# Patient Record
Sex: FEMALE | Race: WHITE | NOT HISPANIC OR LATINO | Employment: UNEMPLOYED | URBAN - METROPOLITAN AREA
[De-identification: names, ages, dates, MRNs, and addresses within clinical notes are randomized per-mention and may not be internally consistent; named-entity substitution may affect disease eponyms.]

---

## 2020-08-28 ENCOUNTER — OFFICE VISIT (OUTPATIENT)
Dept: FAMILY MEDICINE CLINIC | Facility: CLINIC | Age: 35
End: 2020-08-28

## 2020-08-28 VITALS
DIASTOLIC BLOOD PRESSURE: 68 MMHG | WEIGHT: 129.2 LBS | BODY MASS INDEX: 19.13 KG/M2 | HEART RATE: 94 BPM | HEIGHT: 69 IN | SYSTOLIC BLOOD PRESSURE: 110 MMHG | OXYGEN SATURATION: 93 %

## 2020-08-28 DIAGNOSIS — K58.1 IRRITABLE BOWEL SYNDROME WITH CONSTIPATION: ICD-10-CM

## 2020-08-28 DIAGNOSIS — F33.9 EPISODE OF RECURRENT MAJOR DEPRESSIVE DISORDER, UNSPECIFIED DEPRESSION EPISODE SEVERITY (HCC): ICD-10-CM

## 2020-08-28 DIAGNOSIS — Z51.81 THERAPEUTIC DRUG MONITORING: ICD-10-CM

## 2020-08-28 DIAGNOSIS — G47.00 INSOMNIA, UNSPECIFIED TYPE: ICD-10-CM

## 2020-08-28 DIAGNOSIS — F41.1 GENERALIZED ANXIETY DISORDER: Primary | ICD-10-CM

## 2020-08-28 PROCEDURE — 99204 OFFICE O/P NEW MOD 45 MIN: CPT | Performed by: INTERNAL MEDICINE

## 2020-08-28 RX ORDER — CLONAZEPAM 1 MG/1
1 TABLET ORAL 2 TIMES DAILY PRN
Qty: 10 TABLET | Refills: 0 | Status: SHIPPED | OUTPATIENT
Start: 2020-08-28

## 2020-08-28 RX ORDER — CLONAZEPAM 1 MG/1
1 TABLET ORAL 3 TIMES DAILY PRN
COMMUNITY
End: 2020-08-28 | Stop reason: SDUPTHER

## 2020-08-28 RX ORDER — PAROXETINE 30 MG/1
30 TABLET, FILM COATED ORAL EVERY MORNING
Qty: 90 TABLET | Refills: 1 | Status: SHIPPED | OUTPATIENT
Start: 2020-08-28 | End: 2020-11-09

## 2020-08-28 RX ORDER — ETONOGESTREL AND ETHINYL ESTRADIOL 11.7; 2.7 MG/1; MG/1
1 INSERT, EXTENDED RELEASE VAGINAL
COMMUNITY

## 2020-08-28 RX ORDER — PAROXETINE 30 MG/1
30 TABLET, FILM COATED ORAL EVERY MORNING
COMMUNITY
End: 2020-08-28 | Stop reason: SDUPTHER

## 2020-08-28 RX ORDER — HYDROXYZINE PAMOATE 50 MG/1
50 CAPSULE ORAL 3 TIMES DAILY PRN
Qty: 60 CAPSULE | Refills: 5 | Status: SHIPPED | OUTPATIENT
Start: 2020-08-28

## 2020-08-28 RX ORDER — BUPRENORPHINE HYDROCHLORIDE AND NALOXONE HYDROCHLORIDE DIHYDRATE 8; 2 MG/1; MG/1
TABLET SUBLINGUAL
COMMUNITY
Start: 2020-08-21

## 2020-08-28 RX ORDER — ONDANSETRON HYDROCHLORIDE 8 MG/1
TABLET, FILM COATED ORAL
COMMUNITY
Start: 2020-08-24 | End: 2020-12-12 | Stop reason: SDUPTHER

## 2020-08-28 RX ORDER — TRAZODONE HYDROCHLORIDE 50 MG/1
50 TABLET ORAL NIGHTLY
COMMUNITY
End: 2020-08-28 | Stop reason: SDUPTHER

## 2020-08-28 RX ORDER — TRAZODONE HYDROCHLORIDE 50 MG/1
50 TABLET ORAL NIGHTLY
Qty: 90 TABLET | Refills: 1 | Status: SHIPPED | OUTPATIENT
Start: 2020-08-28 | End: 2020-11-09

## 2020-08-28 RX ORDER — HYDROXYZINE PAMOATE 50 MG/1
50 CAPSULE ORAL 3 TIMES DAILY PRN
COMMUNITY
End: 2020-08-28 | Stop reason: SDUPTHER

## 2020-08-28 NOTE — PROGRESS NOTES
Chief Complaint   Patient presents with   • Establish Care       HPI:  Rosette Mendes is a 35 y.o. female who presents today for establish care. She recently moved from [place]. She has reportedly been on daily benzos 1-3x per day for the last 6 months. She is out of her klonopin today and says she is afraid because she has a history of seizures from withdraw. Hx of substance abuse with recent relapse. Currently on suboxone, following with suboxone clinic in [place]. She is attempting to wean off. She would like to wean off klonopin as well, she has a psychiatry apt next Tuesday with Dr. Recio. She has anxiety, depression and insomnia for which she takes paxil and trazodone, respectively. She has IBS constipation and is currently taking lactulose as needed. She recently went to the ER for constipation and was prescribed this. No hx of colonoscopy. Has not tried any other medication besides OTC laxatives.     ROS:  Constitutional: no fevers, night sweats or unexplained weight loss  Eyes: no vision changes  ENT: no runny nose, ear pain, sore throat  Cardio: no chest pain, palpitations  Pulm: no shortness of breath, wheezing, or cough  GI: no abdominal pain or changes in bowel movements  : no difficulty urinating  MSK: no difficulty ambulating, no joint pain  Neuro: no weakness, dizziness or headache  Psych: no trouble sleeping  Endo: no change in appetite      Past Medical History:   Diagnosis Date   • Depression    • Frequent UTI    • Hyperlipidemia    • Ovarian cyst    • Seizures (CMS/HCC)       Family History   Problem Relation Age of Onset   • Arthritis Mother    • Mental illness Mother    • Hypertension Mother    • Hyperlipidemia Mother    • Mental illness Father    • Cancer Maternal Grandmother         Lung Cancer   • Heart attack Maternal Grandmother    • Cancer Maternal Grandfather         Lung Cancer      Social History     Socioeconomic History   • Marital status: Significant Other     Spouse name: Not  on file   • Number of children: Not on file   • Years of education: Not on file   • Highest education level: Not on file   Tobacco Use   • Smoking status: Never Smoker   • Smokeless tobacco: Never Used   Substance and Sexual Activity   • Alcohol use: Never     Frequency: Never   • Drug use: Never      No Known Allergies     There is no immunization history on file for this patient.     PE:  Vitals:    08/28/20 1506   BP: 110/68   Pulse: 94   SpO2: 93%      Body mass index is 19.08 kg/m².    Gen Appearance: NAD  HEENT: Normocephalic, PERRLA, no thyromegaly, trache midline  Heart: RRR, normal S1 and S2, no murmur  Lungs: CTA b/l, no wheezing, no crackles  Abdomen: Soft, non-tender, non-distended, no guarding and BSx4  MSK: Moves all extremities well, normal gait, no peripheral edema  Pulses: Palpable and equal b/l  Lymph nodes: No palpable lymphadenopathy   Neuro: No focal deficits      Current Outpatient Medications   Medication Sig Dispense Refill   • buprenorphine-naloxone (SUBOXONE) 8-2 MG per SL tablet DISSOLVE 1 TABLET SUBLINGUALLY DAILY     • clonazePAM (KlonoPIN) 1 MG tablet Take 1 tablet by mouth 2 (Two) Times a Day As Needed for Anxiety. 10 tablet 0   • etonogestrel-ethinyl estradiol (NuvaRing) 0.12-0.015 MG/24HR vaginal ring Insert 1 each into the vagina Every 28 (Twenty-Eight) Days. Insert vaginally and leave in place for 3 consecutive weeks, then remove for 1 week.     • hydrOXYzine pamoate (VISTARIL) 50 MG capsule Take 1 capsule by mouth 3 (Three) Times a Day As Needed for Anxiety. 60 capsule 5   • ondansetron (ZOFRAN) 8 MG tablet TK 1 T PO Q 8 H     • PARoxetine (PAXIL) 30 MG tablet Take 1 tablet by mouth Every Morning. 90 tablet 1   • traZODone (DESYREL) 50 MG tablet Take 1 tablet by mouth Every Night. 90 tablet 1     No current facility-administered medications for this visit.         Rosette was seen today for establish care.    Diagnoses and all orders for this visit:    Generalized anxiety  disorder  -     clonazePAM (KlonoPIN) 1 MG tablet; Take 1 tablet by mouth 2 (Two) Times a Day As Needed for Anxiety.  -     hydrOXYzine pamoate (VISTARIL) 50 MG capsule; Take 1 capsule by mouth 3 (Three) Times a Day As Needed for Anxiety.  -     Urine Drug Screen - Urine, Clean Catch; Future  Pt agreeable to benzo taper. I would recommend a 2-3 month taper with her history. [employer] is not functioning at this time and unable to obtain [place] controlled substance records anyway. At this time I would recommend reducing klonopin dose to twice daily. Will provide enough tabs to last until psychiatry apt next week. Refill paxil. Will need f/u apt sooner depending on psych apt.   Irritable bowel syndrome with constipation  Trial linzess, failed OTC medication and has long history of chronic constipation. Will refer to GI to establish care, would likely benefit from c-scope.   Episode of recurrent major depressive disorder, unspecified depression episode severity (CMS/HCC)  -     PARoxetine (PAXIL) 30 MG tablet; Take 1 tablet by mouth Every Morning.    Insomnia, unspecified type  -     traZODone (DESYREL) 50 MG tablet; Take 1 tablet by mouth Every Night.    Therapeutic drug monitoring  -     Urine Drug Screen - Urine, Clean Catch; Future         Return in about 4 weeks (around 9/25/2020) for Annual.     Please note that portions of this document were completed with a voice recognition program. Efforts were made to edit the dictations, but occasionally words are mis-transcribed.

## 2020-09-09 DIAGNOSIS — F41.1 GENERALIZED ANXIETY DISORDER: ICD-10-CM

## 2020-09-09 DIAGNOSIS — Z51.81 THERAPEUTIC DRUG MONITORING: ICD-10-CM

## 2020-09-16 ENCOUNTER — TELEPHONE (OUTPATIENT)
Dept: FAMILY MEDICINE CLINIC | Facility: CLINIC | Age: 35
End: 2020-09-16

## 2020-09-17 DIAGNOSIS — K58.1 IRRITABLE BOWEL SYNDROME WITH CONSTIPATION: Primary | ICD-10-CM

## 2020-09-17 DIAGNOSIS — Z12.4 CERVICAL CANCER SCREENING: Primary | ICD-10-CM

## 2020-09-17 NOTE — TELEPHONE ENCOUNTER
I did not see any referrals in patient chart. Were you planning on referring patient to GI and OB/GYN?

## 2020-09-18 ENCOUNTER — TELEPHONE (OUTPATIENT)
Dept: FAMILY MEDICINE CLINIC | Facility: CLINIC | Age: 35
End: 2020-09-18

## 2020-11-03 ENCOUNTER — OFFICE VISIT (OUTPATIENT)
Dept: GASTROENTEROLOGY | Facility: CLINIC | Age: 35
End: 2020-11-03

## 2020-11-03 VITALS
BODY MASS INDEX: 21.33 KG/M2 | WEIGHT: 144 LBS | DIASTOLIC BLOOD PRESSURE: 60 MMHG | TEMPERATURE: 98.4 F | OXYGEN SATURATION: 88 % | SYSTOLIC BLOOD PRESSURE: 112 MMHG | HEART RATE: 67 BPM | HEIGHT: 69 IN

## 2020-11-03 DIAGNOSIS — K59.04 CHRONIC IDIOPATHIC CONSTIPATION: Primary | ICD-10-CM

## 2020-11-03 PROCEDURE — 99243 OFF/OP CNSLTJ NEW/EST LOW 30: CPT | Performed by: INTERNAL MEDICINE

## 2020-11-03 RX ORDER — LACTULOSE 10 G/15ML
SOLUTION ORAL
Qty: 1892 ML | Refills: 6 | Status: SHIPPED | OUTPATIENT
Start: 2020-11-03

## 2020-11-03 NOTE — PROGRESS NOTES
PCP: Enmanuel Edwards DO    Chief Complaint   Patient presents with   • NEW PATIENT; IBS WITH CONSTIPATION        History of Present Illness:   Rosette Mendes is a 35 y.o. female who presents to the GI clinic as a consult for constipation. She has a h/o seizures, depression. Diagnosed with ibs-c in [place]. + ed visit for abdominal pain and improvement with lactulose.Has gone 3 weeks without bm. She will have nausea and epigastric/llq achy intermittent pain. She has to manually remove stool on occasion.  + hematochezia when she manipulates.      Past Medical History:   Diagnosis Date   • Depression    • Frequent UTI    • Hyperlipidemia    • Ovarian cyst    • Seizures (CMS/HCC)        History reviewed. No pertinent surgical history.      Current Outpatient Medications:   •  buprenorphine-naloxone (SUBOXONE) 8-2 MG per SL tablet, DISSOLVE 1 TABLET SUBLINGUALLY DAILY, Disp: , Rfl:   •  clonazePAM (KlonoPIN) 1 MG tablet, Take 1 tablet by mouth 2 (Two) Times a Day As Needed for Anxiety., Disp: 10 tablet, Rfl: 0  •  etonogestrel-ethinyl estradiol (NuvaRing) 0.12-0.015 MG/24HR vaginal ring, Insert 1 each into the vagina Every 28 (Twenty-Eight) Days. Insert vaginally and leave in place for 3 consecutive weeks, then remove for 1 week., Disp: , Rfl:   •  hydrOXYzine pamoate (VISTARIL) 50 MG capsule, Take 1 capsule by mouth 3 (Three) Times a Day As Needed for Anxiety., Disp: 60 capsule, Rfl: 5  •  ondansetron (ZOFRAN) 8 MG tablet, TK 1 T PO Q 8 H, Disp: , Rfl:   •  PARoxetine (PAXIL) 30 MG tablet, Take 1 tablet by mouth Every Morning., Disp: 90 tablet, Rfl: 1  •  traZODone (DESYREL) 50 MG tablet, Take 1 tablet by mouth Every Night., Disp: 90 tablet, Rfl: 1    No Known Allergies    Family History   Problem Relation Age of Onset   • Arthritis Mother    • Mental illness Mother    • Hypertension Mother    • Hyperlipidemia Mother    • Mental illness Father    • Cancer Maternal Grandmother         Lung Cancer   • Heart  attack Maternal Grandmother    • Cancer Maternal Grandfather         Lung Cancer       Social History     Socioeconomic History   • Marital status: Significant Other     Spouse name: Not on file   • Number of children: Not on file   • Years of education: Not on file   • Highest education level: Not on file   Tobacco Use   • Smoking status: Never Smoker   • Smokeless tobacco: Never Used   Substance and Sexual Activity   • Alcohol use: Never     Frequency: Never   • Drug use: Never       Review of Systems   Constitutional: Positive for fatigue and fever (face will flash and super hot, during the moment of unable to have a BM).   Gastrointestinal: Positive for constipation.        IBS WITH CONSTIPATION   All other systems reviewed and are negative.    All other systems reviewed and are negative.    There were no vitals filed for this visit.    Physical Exam  General Appearance:  Vitals as above. no acute distress  Head/face:  Normocephalic, atraumatic  Eyes:   EOMI, no conjunctivitis or icterus   Nose/Sinuses:  Nares patent bilaterally without discharge or lesions  Mouth/Throat:  Normal oral movements without dyskinesia  Neck:  trachea is midline, no thyromegaly  Lungs:  Normal work of breathing effort, no overt rales  Heart:  No overt JVD or type VI murmur  Abdomen:  Nondistended, no guarding or rebound tenderness  Neurologic:  Alert; no focal deficits; age appropriate behavior and speech  Psychiatric: mood and affect are congruent  Vascular: extremities without edema  Skin: no rash or cyanosis.      Assessment/Plan  1.) Constipation  2.) Hematochezia  She definitely has an outlet constipation with manipulation required to evacuate. She may also have slow transit and ibs-c.    Due to hematochezia, will schedule colonoscopy. Would have this done after 1-2 weeks of bowel optimization with medical therapy and give a 48 hour bowel prep.  - start linzess or lactulose  - start miralax, educated how to take  - recommended to  start the process with a tap water enema   - refer for biofeedback therapy  - f/u in 4-6 months    Zaki Raymond MD  11/3/2020

## 2020-11-09 ENCOUNTER — E-VISIT (OUTPATIENT)
Dept: INTERNAL MEDICINE | Facility: CLINIC | Age: 35
End: 2020-11-09

## 2020-11-09 PROCEDURE — U0003 INFECTIOUS AGENT DETECTION BY NUCLEIC ACID (DNA OR RNA); SEVERE ACUTE RESPIRATORY SYNDROME CORONAVIRUS 2 (SARS-COV-2) (CORONAVIRUS DISEASE [COVID-19]), AMPLIFIED PROBE TECHNIQUE, MAKING USE OF HIGH THROUGHPUT TECHNOLOGIES AS DESCRIBED BY CMS-2020-01-R: HCPCS | Performed by: FAMILY MEDICINE

## 2020-11-10 ENCOUNTER — TELEPHONE (OUTPATIENT)
Dept: URGENT CARE | Facility: CLINIC | Age: 35
End: 2020-11-10

## 2020-11-10 ENCOUNTER — TELEPHONE (OUTPATIENT)
Dept: FAMILY MEDICINE CLINIC | Facility: CLINIC | Age: 35
End: 2020-11-10

## 2020-11-10 NOTE — TELEPHONE ENCOUNTER
THE PATIENT REPORTS SHE WENT TO  URGENT CARE [facility] AND WAS DIAGNOSED THE PATIENT WITH BRONCHITIS.  THE PATIENT'S INSURANCE WILL NOT COVER THE albuterol sulfate  WITHOUT A PRIOR AUTHORIZATION.  THE PHARMACY TOLD THE PATIENT TO CONTACT HER PCP (DR. HAMPTON) FOR A PRIOR AUTHORIZATION.     PLEASE CALL AND ADVISE [phone]

## 2020-11-11 ENCOUNTER — TELEPHONE (OUTPATIENT)
Dept: URGENT CARE | Facility: CLINIC | Age: 35
End: 2020-11-11

## 2020-11-11 NOTE — TELEPHONE ENCOUNTER
----- Message from Amaya Schroeder, JOSUÉ, APRN sent at 11/11/2020  1:19 PM EST -----  Please notify the patient of negative result sc    ----- Message -----  From: Lab, Background User  Sent: 11/11/2020   1:10 PM EST  To:  Leyda Winklerid Results

## 2020-11-30 DIAGNOSIS — Z12.11 SCREENING FOR COLON CANCER: Primary | ICD-10-CM

## 2020-12-04 ENCOUNTER — APPOINTMENT (OUTPATIENT)
Dept: PREADMISSION TESTING | Facility: HOSPITAL | Age: 35
End: 2020-12-04

## 2020-12-04 PROCEDURE — U0004 COV-19 TEST NON-CDC HGH THRU: HCPCS

## 2020-12-04 PROCEDURE — C9803 HOPD COVID-19 SPEC COLLECT: HCPCS

## 2020-12-05 LAB — SARS-COV-2 RNA RESP QL NAA+PROBE: NOT DETECTED

## 2020-12-07 ENCOUNTER — OUTSIDE FACILITY SERVICE (OUTPATIENT)
Dept: GASTROENTEROLOGY | Facility: CLINIC | Age: 35
End: 2020-12-07

## 2020-12-07 PROCEDURE — 45378 DIAGNOSTIC COLONOSCOPY: CPT | Performed by: INTERNAL MEDICINE

## 2020-12-12 ENCOUNTER — TELEMEDICINE (OUTPATIENT)
Dept: FAMILY MEDICINE CLINIC | Facility: CLINIC | Age: 35
End: 2020-12-12

## 2020-12-12 ENCOUNTER — TELEPHONE (OUTPATIENT)
Dept: FAMILY MEDICINE CLINIC | Facility: CLINIC | Age: 35
End: 2020-12-12

## 2020-12-12 DIAGNOSIS — R11.2 INTRACTABLE VOMITING WITH NAUSEA, UNSPECIFIED VOMITING TYPE: Primary | ICD-10-CM

## 2020-12-12 PROCEDURE — 99213 OFFICE O/P EST LOW 20 MIN: CPT | Performed by: NURSE PRACTITIONER

## 2020-12-12 RX ORDER — ONDANSETRON HYDROCHLORIDE 8 MG/1
8 TABLET, FILM COATED ORAL EVERY 8 HOURS PRN
Qty: 30 TABLET | Refills: 1 | Status: SHIPPED | OUTPATIENT
Start: 2020-12-12

## 2020-12-12 RX ORDER — FLUOXETINE HYDROCHLORIDE 20 MG/1
1 CAPSULE ORAL
COMMUNITY

## 2020-12-12 NOTE — PROGRESS NOTES
Subjective   Rosette Mendes is a 35 y.o. female. Vomiting    You have chosen to receive care through a telehealth visit.  Do you consent to use a video/audio connection for your medical care today? Yes    History of Present Illness   She has a script for Zofran for as needed use.  She feels like she really needs it right now.  She has a medication that she takes the causes nausea.  She doesn't want to get dehydrated from vomiting.  The Suboxone makes her really sick to her stomach.  She is currently tapering off of this as this is causing other health issues for her.  The nausea started last night.  She started throwing up last night.  She has thrown up 6-7 times.  Everytime she drinks water she throws it up.  The Suboxone has always had this effect on her.    She denies any other needs today.    The following portions of the patient's history were reviewed and updated as appropriate: allergies, current medications, past medical history and problem list.    Review of Systems   Constitutional: Negative.    Respiratory: Negative.    Cardiovascular: Negative.    Gastrointestinal: Positive for nausea and vomiting. Negative for diarrhea.   Genitourinary: Negative.        Objective   There were no vitals filed for this visit.   Physical Exam  Constitutional:       Appearance: Normal appearance.   HENT:      Head: Normocephalic and atraumatic.   Neurological:      General: No focal deficit present.      Mental Status: She is alert and oriented to person, place, and time.   Psychiatric:         Mood and Affect: Mood normal.         Behavior: Behavior normal.         Thought Content: Thought content normal.         Judgment: Judgment normal.       Assessment/Plan   Diagnoses and all orders for this visit:    1. Intractable vomiting with nausea, unspecified vomiting type (Primary) -occurring d/t Suboxone.  Pt is currently trying to taper off of this d/t the SE.  She is going to start taking 2mg instead of 4mg.  She needs a  refill on the Zofran as this works well to control her n/v.  Zofran 8mg q8h PRN sent to pharmacy.  Discussed with pt that if this medication is not effective and she is unable to keep up with her fluid losses that she will need to go to the ED.  Pt verbalized understanding.  -     ondansetron (ZOFRAN) 8 MG tablet; Take 1 tablet by mouth Every 8 (Eight) Hours As Needed for Nausea or Vomiting.  Dispense: 30 tablet; Refill: 1         Plan of care discussed with pt and pt is agreeable.    Return if symptoms worsen or fail to improve.    Deloris Valencia, APRN